# Patient Record
Sex: MALE | Race: WHITE
[De-identification: names, ages, dates, MRNs, and addresses within clinical notes are randomized per-mention and may not be internally consistent; named-entity substitution may affect disease eponyms.]

---

## 2018-04-20 ENCOUNTER — HOSPITAL ENCOUNTER (OUTPATIENT)
Dept: HOSPITAL 80 - F3N | Age: 68
Setting detail: OBSERVATION
LOS: 48 days | Discharge: HOME | End: 2018-06-07
Attending: ORTHOPAEDIC SURGERY | Admitting: ORTHOPAEDIC SURGERY
Payer: COMMERCIAL

## 2018-04-20 DIAGNOSIS — G47.33: ICD-10-CM

## 2018-04-20 DIAGNOSIS — G62.0: ICD-10-CM

## 2018-04-20 DIAGNOSIS — T45.1X5A: ICD-10-CM

## 2018-04-20 DIAGNOSIS — M17.12: Primary | ICD-10-CM

## 2018-04-20 DIAGNOSIS — Z85.05: ICD-10-CM

## 2018-04-20 DIAGNOSIS — Z96.641: ICD-10-CM

## 2018-04-20 DIAGNOSIS — Z87.891: ICD-10-CM

## 2018-04-20 DIAGNOSIS — Z96.7: ICD-10-CM

## 2018-04-20 DIAGNOSIS — Z86.718: ICD-10-CM

## 2018-04-20 PROCEDURE — 97116 GAIT TRAINING THERAPY: CPT

## 2018-04-20 PROCEDURE — 97161 PT EVAL LOW COMPLEX 20 MIN: CPT

## 2018-04-20 PROCEDURE — 73560 X-RAY EXAM OF KNEE 1 OR 2: CPT

## 2018-04-20 PROCEDURE — 27447 TOTAL KNEE ARTHROPLASTY: CPT

## 2018-04-20 PROCEDURE — 0396T: CPT

## 2018-04-20 PROCEDURE — 88311 DECALCIFY TISSUE: CPT

## 2018-05-30 ENCOUNTER — HOSPITAL ENCOUNTER (OUTPATIENT)
Dept: HOSPITAL 80 - FIMAGING | Age: 68
End: 2018-05-30
Attending: ORTHOPAEDIC SURGERY
Payer: COMMERCIAL

## 2018-05-30 DIAGNOSIS — Z01.818: Primary | ICD-10-CM

## 2018-05-30 DIAGNOSIS — M25.462: ICD-10-CM

## 2018-05-30 DIAGNOSIS — M17.12: ICD-10-CM

## 2018-05-30 DIAGNOSIS — M23.42: ICD-10-CM

## 2018-06-06 PROCEDURE — 8E0YXBZ COMPUTER ASSISTED PROCEDURE OF LOWER EXTREMITY: ICD-10-PCS | Performed by: ORTHOPAEDIC SURGERY

## 2018-06-06 PROCEDURE — 8E0Y0CZ ROBOTIC ASSISTED PROCEDURE OF LOWER EXTREMITY, OPEN APPROACH: ICD-10-PCS | Performed by: ORTHOPAEDIC SURGERY

## 2018-06-06 PROCEDURE — 0SRD06Z REPLACEMENT OF LEFT KNEE JOINT WITH OXIDIZED ZIRCONIUM ON POLYETHYLENE SYNTHETIC SUBSTITUTE, OPEN APPROACH: ICD-10-PCS | Performed by: ORTHOPAEDIC SURGERY

## 2018-06-06 RX ADMIN — ACETAMINOPHEN SCH MG: 325 TABLET ORAL at 17:12

## 2018-06-06 RX ADMIN — OXYCODONE HYDROCHLORIDE PRN MG: 15 TABLET ORAL at 12:32

## 2018-06-06 RX ADMIN — DOCUSATE SODIUM AND SENNOSIDES SCH TAB: 50; 8.6 TABLET ORAL at 21:30

## 2018-06-06 RX ADMIN — ACETAMINOPHEN SCH MG: 325 TABLET ORAL at 13:27

## 2018-06-06 RX ADMIN — FAMOTIDINE SCH MG: 20 TABLET, FILM COATED ORAL at 21:30

## 2018-06-06 RX ADMIN — ACETAMINOPHEN SCH MG: 325 TABLET ORAL at 13:32

## 2018-06-06 RX ADMIN — OXYCODONE HYDROCHLORIDE PRN MG: 15 TABLET ORAL at 21:31

## 2018-06-06 RX ADMIN — ASPIRIN 81 MG SCH MG: 81 TABLET ORAL at 21:30

## 2018-06-06 RX ADMIN — Medication SCH MLS: at 17:14

## 2018-06-06 RX ADMIN — OXYCODONE HYDROCHLORIDE PRN MG: 15 TABLET ORAL at 17:12

## 2018-06-06 NOTE — POSTOPPROG
Post Op Note


Date of Operation: 06/06/18


Surgeon: LATOYA Rosa


Assistant: mary jane rosa


Anesthesiologist: dr. santiago


Anesthesia: Spinal, Other (Specify) (adductor canal block)


Pre-op Diagnosis: left knee OA


Post-op Diagnosis: same


Indication: left knee pain due to OA that failed conservative measures


Procedure: L TKA robot assisted, computer navigation and sensor assisted


Findings: severe knee OA


Inf/Abcess present in the surg proc area at time of surgery?: No


EBL:

## 2018-06-06 NOTE — PDANEPAE
ANE History of Present Illness





here for L TKA 





ANE Past Medical History





- Cardiovascular History


Hx Hypertension: No


Hx Arrhythmias: No


Hx Chest Pain: No


Hx Coronary Artery / Peripheral Vascular Disease: No


Hx CHF / Valvular Disease: No


Cardiovascular History Comment: DVT 3/2013





- Pulmonary History


Hx COPD: No


Hx Asthma/Reactive Airway Disease: No


Hx Recent Upper Respiratory Infection: No


Hx Oxygen in Use at Home: No


Hx Sleep Apnea: Yes


Sleep Apnea Screening Result - Last Documented: Positive


Pulmonary History Comment: LORENZO POSITIVE USES CPAP INSTRUCTED PT TO BRING





- Neurologic History


Hx Cerebrovascular Accident: No


Hx Seizures: No


Hx Dementia: No


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 

BETTER





- Endocrine History


Hx Diabetes: Yes


Endocrine History Comment: TYPE 2 WELL CONTROLLED





- Renal History


Hx Renal Disorders: No





- Liver History


Hx Hepatic Disorders: Yes


Hepatic History Comment: LIVER CA 12/2013





- Neurological & Psychiatric Hx


Hx Neurological and Psychiatric Disorders: Yes


Neurological / Psychiatric History Comment: INSOMNIA.  ANXIETY





- Cancer History


Hx Cancer: Yes


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





- Congenital Disorder History


Hx Congenital Disorders: No





- GI History


Hx Gastrointestinal Disorders: Yes


Gastrointestinal History Comment: REFLUX- ON PROTONIX





- Other Health History


Other Health History: NONE





- Chronic Pain History


Chronic Pain: Yes (LEFT HIP, KNEE AND BILATERAL FEET PAIN)





- Surgical History


Prior Surgeries: BILE DUCT SURGERY AND LIVER RESECTION 12/2013.  RIGHT HIP 

REPLACEMENT 2007.  RIGHT HIP REPAIR 2009.  LEFT KNEE SCOPE.  RIGHT KNEE 

SURGERIES X2.  RIGHT ACHILLIES TENDON REPLACEMENT.  RECONSTRUCTION OF RIGHT AND 

LEFT THUMBS





ANE Review of Systems


Review of systems is: negative


Review of Systems: 








- Exercise capacity


Exercise capacity: >=4 METS


METS (RN): 4 METS





ANE Patient History





- Allergies


Allergies/Adverse Reactions: 








No Known Allergies Allergy (Unverified 10/09/14 15:40)


 








- Home Medications


Home medications: home medication list seen and reviewed


Home Medications: 








Amitriptyline HCl [Elavil 10 mg (*)] 10 mg PO HS 10/09/14 [Last Taken 10/14/14]


Eszopiclone [Lunesta] 2 mg PO HS 10/09/14 [Last Taken 06/04/18]


Calcium Carbonate [Oyster Shell Calcium 500 mg (*)] 500 mg PO DAILY 03/30/18 [

Last Taken 2 Weeks Ago ~05/23/18]


Herbals/Supplements -Info Only 1 ea PO DAILY 03/30/18 [Last Taken 2 Weeks Ago ~

05/23/18]


LORazepam [Ativan (*)] 0.5 mg PO DAILY PRN 03/30/18 [Last Taken 06/04/18]


Pantoprazole Sodium [Protonix] 10 mg PO DAILY 03/30/18 [Last Taken Unknown]


Prazosin HCl [Minipress 1mg (*)] 1 mg PO HS 03/30/18 [Last Taken 06/04/18]


Pregabalin [Lyrica 50mg (*)] 50 mg PO HS PRN 03/30/18 [Last Taken 06/04/18]


celeCOXIB [Celebrex (*)] 200 mg PO DAILY PRN 03/30/18 [Last Taken 06/05/18]


oxyCODONE IR [Oxycodone Ir (*)] 5 mg PO PRN PRN 06/06/18 [Last Taken 06/05/18]








- NPO status


NPO Status: no food or drink >8 hours


NPO Since - Liquids (Date): 06/05/18


NPO Since - Liquids (Time): 22:30


NPO Since - Solids (Date): 06/05/18


NPO Since - Solids (Time): 22:00





- Smoking Hx


Smoking Status: Former smoker





- Family Anes Hx


Family Hx Anesthesia Complications: NONE





ANE Labs/Vital Signs





- Vital Signs


Vital Signs: reviewed preoperatively; see RN documention for details


Blood Pressure: 124/81


Heart Rate: 64


Respiratory Rate: 18


O2 Sat (%): 93


Height: 177.8 cm


Weight: 95.254 kg





ANE Physical Exam





- Airway


Neck exam: FROM


Mallampati Score: Class 2


Mouth exam: normal dental/mouth exam





- Pulmonary


Pulmonary: no respiratory distress





- Cardiovascular


Cardiovascular: regular rate and rhythym





- ASA Status


ASA Status: II





ANE Anesthesia Plan


Anesthesia Plan: spinal


Regional Anesthesia: adductor canal FNB

## 2018-06-06 NOTE — GOP
[f 
rep st]



                                                                OPERATIVE REPORT





DATE OF OPERATION:  06/06/2018



SURGEON:  JEANETTE Lopez MD



ASSISTANT:  ABHIJEET Neff



ANESTHESIA:  Spinal.



PREOPERATIVE DIAGNOSIS:  Left knee arthritis.



POSTOPERATIVE DIAGNOSIS:  Left knee arthritis.



PROCEDURE PERFORMED:  1) Left total knee replacement with computer navigation, 
robotic assist.

2) INtra -operative use of kinematic sensor (0396T)



FINDINGS:  Severe medial and patellofemoral osteoarthritis.





ESTIMATED BLOOD LOSS:  30 cc.



INDICATIONS:  The patient is a 67-year-old gentleman with severe and 
progressive pain and deformity of the left knee unresponsive to conservative 
care.  The risks and benefits of surgical intervention were explained in detail.



DESCRIPTION OF PROCEDURE:  The patient was brought to the operative room and 
placed on the table in the supine position. Spinal anesthesia was induced 
without difficulty. A pneumatic tourniquet was applied about the left proximal 
thigh, and the leg was prepped and draped in a sterile fashion. The leg mejia 
was applied. After exsanguination by elevation the tourniquet was inflated to 
250 mmHg. 



Incision was made anterior medial from the tibial tuberosity to a point 2 cm 
proximal to the superior pole of the patella. Medial parapatellar arthrotomy 
was carried out from the superior pole of the patella and posteriorly in line 
with the fibers of the Type II VMO. The medial collateral ligament was elevated 
and the infrapatellar fat pad was resected. 



The patella was everted and the articular surface was excised. A 38 mm patellar 
button was placed. 



Attention was turned first to the distal aspect of the femur.  After exposure 
of the femur, 2 half pins were placed for fixation of the femoral array.  In a 
similar fashion, 2 pins were placed anteromedial on the tibia for fixation of 
the tibial array.  External land marking and registration of the hip center was 
performed without difficulty.  Internal femoral and tibial registration was 
carried out without difficulty and the femoral and tibial checkpoints were 
placed and verified for accuracy. 



Attention was turned to the femur.  The foot print for the size 6 femoral 
component was cut with the saw using the [employer] robotic system and verified for 
accuracy against the CT based plan.  In a similar fashion, the saw was used to 
cut the footprint for the size 6 tibial component using the [employer] system and 
verified for accuracy against the CT based plan. The tibial articular surface 
was excised without difficulty, followed by the intercondylar box cut. 



The knee was extended and the remnants of the medial and lateral meniscus were 
excised. The posterior capsule was injected with ropivacaine, epinephrine and 
Toradol.  A size 6 tibial tray was positioned. Trial reduction was then carried 
out. There was excellent range of motion, alignment, and stability using the 6 
x 9 mm polyethylene. 



All trials were then removed. The joint was thoroughly irrigated and carefully 
dried. The presfit components were implanted. A [address] kinematic sensor was 
used to evaluate joint balance and stability. The permanent 9 mm polyethylene 
was placed without difficulty. 



The tourniquet was deflated and all bleeders were coagulated. The wound was 
thoroughly irrigated and closed using interrupted sutures of 2-0 Vicryl for the 
joint capsule. The subcu was closed with 3-0 Vicryl and the skin with 4-0 
Monocryl.  Dermabond and Steri-Strips were applied followed by a compressive 
dressing. The patient was then moved from the operating room to the recovery 
room in good condition, having tolerated the procedure well.





Job #:  [id number]

MTDD

## 2018-06-07 VITALS — DIASTOLIC BLOOD PRESSURE: 68 MMHG | SYSTOLIC BLOOD PRESSURE: 110 MMHG

## 2018-06-07 LAB — PLATELET # BLD: 113 10^3/UL (ref 150–400)

## 2018-06-07 RX ADMIN — ACETAMINOPHEN SCH MG: 325 TABLET ORAL at 01:01

## 2018-06-07 RX ADMIN — ASPIRIN 81 MG SCH MG: 81 TABLET ORAL at 08:41

## 2018-06-07 RX ADMIN — OXYCODONE HYDROCHLORIDE PRN MG: 15 TABLET ORAL at 06:06

## 2018-06-07 RX ADMIN — OXYCODONE HYDROCHLORIDE PRN MG: 15 TABLET ORAL at 10:30

## 2018-06-07 RX ADMIN — FAMOTIDINE SCH MG: 20 TABLET, FILM COATED ORAL at 08:41

## 2018-06-07 RX ADMIN — ACETAMINOPHEN SCH MG: 325 TABLET ORAL at 06:05

## 2018-06-07 RX ADMIN — Medication SCH MLS: at 01:01

## 2018-06-07 RX ADMIN — ACETAMINOPHEN SCH MG: 325 TABLET ORAL at 12:04

## 2018-06-07 RX ADMIN — DOCUSATE SODIUM AND SENNOSIDES SCH TAB: 50; 8.6 TABLET ORAL at 08:41

## 2018-06-07 RX ADMIN — OXYCODONE HYDROCHLORIDE PRN MG: 15 TABLET ORAL at 01:05

## 2018-06-07 NOTE — SOAPPROG
SOAP Progress Note


Assessment/Plan: 


Assessment:





Patient is doing well POD 1 s/p L TKA


Pain management: pain is well controlled on oral pain meds.


VTE ppx: recommend aspirin 81 mg BID for 4 weeks, cont DENNYS and SCDs


Anemia: level is expected initially postop. Asymptomatic. Continue to monitor 


D/c planning: d/c to home today pending release from PT 


























Plan:





06/07/18 09:07





Subjective: 





pan is doing well today, denies SOB, chest pain and N/V.


Objective: 





 Vital Signs











Temp Pulse Resp BP Pulse Ox


 


 36.4 C   69   16   117/75   97 


 


 06/07/18 08:00  06/07/18 08:00  06/07/18 08:00  06/07/18 08:00  06/07/18 08:00








 Laboratory Results





 06/07/18 05:17 





 











 06/06/18 06/07/18 06/08/18





 05:59 05:59 05:59


 


Intake Total  3420 


 


Output Total  855 


 


Balance  2565 








LLE; incision dressing is clean and dry, NVI, +pf/df





ICD10 Worksheet


Patient Problems: 


 Problems











Problem Status Onset


 


Primary localized osteoarthritis of left knee Acute  


 


Osteoarthritis of hip Acute